# Patient Record
Sex: MALE | Race: AMERICAN INDIAN OR ALASKA NATIVE
[De-identification: names, ages, dates, MRNs, and addresses within clinical notes are randomized per-mention and may not be internally consistent; named-entity substitution may affect disease eponyms.]

---

## 2017-01-18 ENCOUNTER — HOSPITAL ENCOUNTER (EMERGENCY)
Dept: HOSPITAL 5 - ED | Age: 32
Discharge: PSYCHIATRIC OR SUBSTANCE USE FACILITY | End: 2017-01-18
Payer: MEDICARE

## 2017-01-18 VITALS — DIASTOLIC BLOOD PRESSURE: 87 MMHG | SYSTOLIC BLOOD PRESSURE: 147 MMHG

## 2017-01-18 DIAGNOSIS — R45.851: ICD-10-CM

## 2017-01-18 DIAGNOSIS — I10: ICD-10-CM

## 2017-01-18 DIAGNOSIS — E11.9: ICD-10-CM

## 2017-01-18 DIAGNOSIS — F12.10: ICD-10-CM

## 2017-01-18 DIAGNOSIS — F17.200: ICD-10-CM

## 2017-01-18 DIAGNOSIS — F25.9: Primary | ICD-10-CM

## 2017-01-18 LAB
ANION GAP SERPL CALC-SCNC: 18 MMOL/L
BASOPHILS NFR BLD AUTO: 0.8 % (ref 0–1.8)
BILIRUB UR QL STRIP: (no result)
BLOOD UR QL VISUAL: (no result)
BUN SERPL-MCNC: 9 MG/DL (ref 9–20)
BUN/CREAT SERPL: 10 %
CALCIUM SERPL-MCNC: 9.2 MG/DL (ref 8.4–10.2)
CHLORIDE SERPL-SCNC: 98.2 MMOL/L (ref 98–107)
CO2 SERPL-SCNC: 25 MMOL/L (ref 22–30)
EOSINOPHIL NFR BLD AUTO: 0.9 % (ref 0–4.3)
GLUCOSE SERPL-MCNC: 103 MG/DL (ref 75–100)
HCT VFR BLD CALC: 45.2 % (ref 35.5–45.6)
HGB BLD-MCNC: 14.9 GM/DL (ref 11.8–15.2)
KETONES UR STRIP-MCNC: (no result) MG/DL
LEUKOCYTE ESTERASE UR QL STRIP: (no result)
MCH RBC QN AUTO: 29 PG (ref 28–32)
MCHC RBC AUTO-ENTMCNC: 33 % (ref 32–34)
MCV RBC AUTO: 87 FL (ref 84–94)
MUCOUS THREADS #/AREA URNS HPF: (no result) /HPF
NITRITE UR QL STRIP: (no result)
PH UR STRIP: 6 [PH] (ref 5–7)
PLATELET # BLD: 279 K/MM3 (ref 140–440)
POTASSIUM SERPL-SCNC: 4 MMOL/L (ref 3.6–5)
PROT UR STRIP-MCNC: (no result) MG/DL
RBC # BLD AUTO: 5.22 M/MM3 (ref 3.65–5.03)
RBC #/AREA URNS HPF: 21 /HPF (ref 0–6)
SODIUM SERPL-SCNC: 137 MMOL/L (ref 137–145)
URINE DRUGS OF ABUSE NOTE: (no result)
UROBILINOGEN UR-MCNC: < 2 MG/DL (ref ?–2)
WBC # BLD AUTO: 11.2 K/MM3 (ref 4.5–11)
WBC #/AREA URNS HPF: 1 /HPF (ref 0–6)

## 2017-01-18 PROCEDURE — 80307 DRUG TEST PRSMV CHEM ANLYZR: CPT

## 2017-01-18 PROCEDURE — 81001 URINALYSIS AUTO W/SCOPE: CPT

## 2017-01-18 PROCEDURE — G0480 DRUG TEST DEF 1-7 CLASSES: HCPCS

## 2017-01-18 PROCEDURE — 80048 BASIC METABOLIC PNL TOTAL CA: CPT

## 2017-01-18 PROCEDURE — 85025 COMPLETE CBC W/AUTO DIFF WBC: CPT

## 2017-01-18 PROCEDURE — 36415 COLL VENOUS BLD VENIPUNCTURE: CPT

## 2017-01-18 PROCEDURE — 80320 DRUG SCREEN QUANTALCOHOLS: CPT

## 2017-01-18 PROCEDURE — 99285 EMERGENCY DEPT VISIT HI MDM: CPT

## 2017-01-18 NOTE — EMERGENCY DEPARTMENT REPORT
HPI





- General


Chief Complaint: Psych


Time Seen by Provider: 01/18/17 12:02





- HPI


HPI: 


Room 16





The patient is a 31-year-old male presenting with a chief complaint of 

schizoaffective disorder.  Patient states he doesn't an argument with his 

mother property.  The patient states he called police who chest.  When they 

arrived they notified him that his mother had a restraining order against.  The 

patient states the  wanted patient to come to the hospital for 

evaluation.  The patient states he has not had any of his psychiatric 

medications for the past 5-6 days.  Patient denies suicidal or homicidal 

ideation.  Patient denies auditory hallucinations but admits to occasional 

visual hallucinations when he sees "colors."





Location: Mental state


Duration: [see above]


Quality: Agitated


Severity: Moderate


Modifying factors: [see above]


Context: [see above]


Mode of transportation: [not driving]








ED Past Medical Hx





- Past Medical History


Previous Medical History?: Yes


Hx Hypertension: Yes


Hx Diabetes: Yes


Hx Psychiatric Treatment: Yes





- Surgical History


Past Surgical History?: No





- Family History


Family history: no significant





- Social History


Smoking Status: Current Every Day Smoker (1/2 pack per day)


Substance Use Type: None, Marijuana





- Medications


Home Medications: 


 Home Medications











 Medication  Instructions  Recorded  Confirmed  Last Taken  Type


 


DULoxetine [Cymbalta] 30 mg PO BID 04/17/16 04/17/16 3 Days Ago History


 


Lurasidone HCl [Latuda] 40 mg PO QHS 04/17/16 04/17/16 3 Days Ago History


 


diphenhydrAMINE [Benadryl CAP] 50 mg PO QHS PRN 04/17/16 04/17/16 3 Days Ago 

History


 


Haloperidol Lactate [Haldol]  01/18/17  Unknown History


 


Lisinopril [Zestril TAB]  01/18/17  Unknown History


 


traZODone [Desyrel]  01/18/17  Unknown History














ED Review of Systems


ROS: 


Stated complaint: MH EVALUATION


Other details as noted in HPI





Comment: All other systems reviewed and negative


Constitutional: denies: chills, fever


Eyes: denies: eye pain, eye discharge, vision change


ENT: denies: ear pain, throat pain


Respiratory: denies: cough, shortness of breath, wheezing


Cardiovascular: denies: chest pain, palpitations


Endocrine: no symptoms reported


Gastrointestinal: denies: abdominal pain, nausea, diarrhea


Genitourinary: denies: urgency, dysuria


Musculoskeletal: denies: back pain, joint swelling, arthralgia


Skin: denies: rash, lesions


Neurological: headache


Psychiatric: visual hallucinations.  denies: auditory hallucinations, homicidal 

thoughts, suicidal thoughts


Hematological/Lymphatic: denies: easy bleeding, easy bruising





Physical Exam





- Physical Exam


Vital Signs: 


 Vital Signs











  01/18/17 01/18/17





  07:10 12:06


 


Temperature 98.2 F 98.4 F


 


Pulse Rate 70 79


 


Respiratory 20 18





Rate  


 


Blood Pressure 140/102 


 


Blood Pressure  137/92





[Left]  


 


O2 Sat by Pulse 100 100





Oximetry  











Physical Exam: 


GENERAL: The patient is well-developed well-nourished male lying on stretcher 

not appearing to be in acute distress. []


HEENT: Normocephalic.  Atraumatic.  Extraocular motions are intact.  Patient 

has moist mucous membranes.


NECK: Supple.  No meningitic signs are noted.  Trachea midline


CHEST/LUNGS: Clear to auscultation.  There is no respiratory distress noted.


HEART/CARDIOVASCULAR: Regular.  There is no tachycardia.  There is no gallop 

rub or murmur.


ABDOMEN: Abdomen is soft, nontender.  Patient has normal bowel sounds.  There 

is no abdominal distention.


SKIN: There is no rash.  There is no edema.  There is no diaphoresis.


NEURO: The patient is awake, alert, and oriented.  The patient is cooperative. 

The patient has normal speech


MUSCULOSKELETAL: There is no evidence of acute injury.








ED Course


 Vital Signs











  01/18/17 01/18/17





  07:10 12:06


 


Temperature 98.2 F 98.4 F


 


Pulse Rate 70 79


 


Respiratory 20 18





Rate  


 


Blood Pressure 140/102 


 


Blood Pressure  137/92





[Left]  


 


O2 Sat by Pulse 100 100





Oximetry  














- Consultations


Consultation #1: 





01/18/17 13:00





Case discussed with mental health consultant.  He states the patient admitted 

to suicidal ideation with him.  Patient subsequently placed on 1013








ED Medical Decision Making





- Lab Data


Result diagrams: 


 01/18/17 07:37





 01/18/17 07:37





 Laboratory Tests











  01/18/17 01/18/17 01/18/17





  07:17 07:17 07:37


 


WBC   


 


RBC   


 


Hgb   


 


Hct   


 


MCV   


 


MCH   


 


MCHC   


 


RDW   


 


Plt Count   


 


Lymph % (Auto)   


 


Mono % (Auto)   


 


Eos % (Auto)   


 


Baso % (Auto)   


 


Lymph #   


 


Mono #   


 


Eos #   


 


Baso #   


 


Seg Neutrophils %   


 


Seg Neutrophils #   


 


Sodium    137


 


Potassium    4.0


 


Chloride    98.2


 


Carbon Dioxide    25


 


Anion Gap    18


 


BUN    9


 


Creatinine    0.9


 


Estimated GFR    > 60


 


BUN/Creatinine Ratio    10.00


 


Glucose    103 H


 


Calcium    9.2


 


Urine Color  Straw  


 


Urine Turbidity  Clear  


 


Urine pH  6.0  


 


Ur Specific Gravity  1.012  


 


Urine Protein  <15 mg/dl  


 


Urine Glucose (UA)  Neg  


 


Urine Ketones  Neg  


 


Urine Blood  Mod  


 


Urine Nitrite  Neg  


 


Urine Bilirubin  Neg  


 


Urine Urobilinogen  < 2.0  


 


Ur Leukocyte Esterase  Neg  


 


Urine WBC (Auto)  1.0  


 


Urine RBC (Auto)  21.0  


 


Urine Mucus  Few  


 


Urine Opiates Screen   Presumptive negative 


 


Urine Methadone Screen   Presumptive negative 


 


Ur Barbiturates Screen   Presumptive negative 


 


Ur Phencyclidine Scrn   Presumptive negative 


 


Ur Amphetamines Screen   Presumptive negative 


 


U Benzodiazepines Scrn   Presumptive negative 


 


Urine Cocaine Screen   Presumptive negative 


 


U Marijuana (THC) Screen   Presumptive negative 


 


Drugs of Abuse Note   Disclamer 


 


Plasma/Serum Alcohol   














  01/18/17 01/18/17





  07:37 07:37


 


WBC   11.2 H


 


RBC   5.22 H


 


Hgb   14.9


 


Hct   45.2


 


MCV   87


 


MCH   29


 


MCHC   33


 


RDW   14.0


 


Plt Count   279


 


Lymph % (Auto)   20.8


 


Mono % (Auto)   8.1 H


 


Eos % (Auto)   0.9


 


Baso % (Auto)   0.8


 


Lymph #   2.3


 


Mono #   0.9 H


 


Eos #   0.1


 


Baso #   0.1


 


Seg Neutrophils %   69.4


 


Seg Neutrophils #   7.8 H


 


Sodium  


 


Potassium  


 


Chloride  


 


Carbon Dioxide  


 


Anion Gap  


 


BUN  


 


Creatinine  


 


Estimated GFR  


 


BUN/Creatinine Ratio  


 


Glucose  


 


Calcium  


 


Urine Color  


 


Urine Turbidity  


 


Urine pH  


 


Ur Specific Gravity  


 


Urine Protein  


 


Urine Glucose (UA)  


 


Urine Ketones  


 


Urine Blood  


 


Urine Nitrite  


 


Urine Bilirubin  


 


Urine Urobilinogen  


 


Ur Leukocyte Esterase  


 


Urine WBC (Auto)  


 


Urine RBC (Auto)  


 


Urine Mucus  


 


Urine Opiates Screen  


 


Urine Methadone Screen  


 


Ur Barbiturates Screen  


 


Ur Phencyclidine Scrn  


 


Ur Amphetamines Screen  


 


U Benzodiazepines Scrn  


 


Urine Cocaine Screen  


 


U Marijuana (THC) Screen  


 


Drugs of Abuse Note  


 


Plasma/Serum Alcohol  < 0.01 

















- Differential Diagnosis


schizoaffective disorder


Critical care attestation.: 


If time is entered above; I have spent that time in minutes in the direct care 

of this critically ill patient, excluding procedure time.








ED Disposition


Clinical Impression: 


 Schizoaffective disorder, Suicidal ideation


Disposition: DC/TX PSY HOSP/PSY UNIT


Is pt being admited?: No


Does the pt Need Aspirin: No


Condition: Serious


Referrals: 


SAEED,KHUSHBOO J, MD [Primary Care Provider] - 3-5 Days


Time of Disposition: 13:00 (awaiting acceptance)

## 2017-02-23 ENCOUNTER — HOSPITAL ENCOUNTER (EMERGENCY)
Dept: HOSPITAL 5 - ED | Age: 32
LOS: 1 days | Discharge: PSYCHIATRIC OR SUBSTANCE USE FACILITY | End: 2017-02-24
Payer: MEDICARE

## 2017-02-23 DIAGNOSIS — F17.200: ICD-10-CM

## 2017-02-23 DIAGNOSIS — E78.00: ICD-10-CM

## 2017-02-23 DIAGNOSIS — Z91.013: ICD-10-CM

## 2017-02-23 DIAGNOSIS — R45.851: Primary | ICD-10-CM

## 2017-02-23 DIAGNOSIS — I10: ICD-10-CM

## 2017-02-23 DIAGNOSIS — F20.9: ICD-10-CM

## 2017-02-23 PROCEDURE — 80048 BASIC METABOLIC PNL TOTAL CA: CPT

## 2017-02-23 PROCEDURE — 85025 COMPLETE CBC W/AUTO DIFF WBC: CPT

## 2017-02-23 PROCEDURE — 80307 DRUG TEST PRSMV CHEM ANLYZR: CPT

## 2017-02-23 PROCEDURE — 81001 URINALYSIS AUTO W/SCOPE: CPT

## 2017-02-23 PROCEDURE — 99285 EMERGENCY DEPT VISIT HI MDM: CPT

## 2017-02-23 PROCEDURE — 36415 COLL VENOUS BLD VENIPUNCTURE: CPT

## 2017-02-23 PROCEDURE — G0480 DRUG TEST DEF 1-7 CLASSES: HCPCS

## 2017-02-23 PROCEDURE — 80320 DRUG SCREEN QUANTALCOHOLS: CPT

## 2017-02-24 VITALS — DIASTOLIC BLOOD PRESSURE: 74 MMHG | SYSTOLIC BLOOD PRESSURE: 115 MMHG

## 2017-02-24 LAB
ANION GAP SERPL CALC-SCNC: 17 MMOL/L
BASOPHILS NFR BLD AUTO: 1 % (ref 0–1.8)
BILIRUB UR QL STRIP: (no result)
BLOOD UR QL VISUAL: (no result)
BUN SERPL-MCNC: 10 MG/DL (ref 9–20)
BUN/CREAT SERPL: 10 %
CALCIUM SERPL-MCNC: 9.5 MG/DL (ref 8.4–10.2)
CHLORIDE SERPL-SCNC: 96.1 MMOL/L (ref 98–107)
CO2 SERPL-SCNC: 28 MMOL/L (ref 22–30)
EOSINOPHIL NFR BLD AUTO: 0.8 % (ref 0–4.3)
GLUCOSE SERPL-MCNC: 93 MG/DL (ref 75–100)
HCT VFR BLD CALC: 46.3 % (ref 35.5–45.6)
HGB BLD-MCNC: 15.4 GM/DL (ref 11.8–15.2)
KETONES UR STRIP-MCNC: (no result) MG/DL
LEUKOCYTE ESTERASE UR QL STRIP: (no result)
MCH RBC QN AUTO: 29 PG (ref 28–32)
MCHC RBC AUTO-ENTMCNC: 33 % (ref 32–34)
MCV RBC AUTO: 86 FL (ref 84–94)
MUCOUS THREADS #/AREA URNS HPF: (no result) /HPF
NITRITE UR QL STRIP: (no result)
PH UR STRIP: 6 [PH] (ref 5–7)
PLATELET # BLD: 312 K/MM3 (ref 140–440)
POTASSIUM SERPL-SCNC: 4 MMOL/L (ref 3.6–5)
PROT UR STRIP-MCNC: (no result) MG/DL
RBC # BLD AUTO: 5.39 M/MM3 (ref 3.65–5.03)
RBC #/AREA URNS HPF: 13 /HPF (ref 0–6)
SODIUM SERPL-SCNC: 137 MMOL/L (ref 137–145)
URINE DRUGS OF ABUSE NOTE: (no result)
UROBILINOGEN UR-MCNC: 2 MG/DL (ref ?–2)
WBC # BLD AUTO: 12.8 K/MM3 (ref 4.5–11)
WBC #/AREA URNS HPF: 1 /HPF (ref 0–6)

## 2017-02-24 NOTE — CONSULTATION
History of Present Illness





- Reason for Consult


Consult date: 02/24/17


Reason for consult: homeless, needs placement because he got kicked out of a 

[facility] @ [place]





- Chief Complaint


Chief complaint: 





31 year old undomiciled male with a reported past psychiatric history of 

Schizophrenia who now presents due to being administratively discharged for 

[facility] after he had an altercation with a peer. Consequently, he 

reported experiencing suicidal thought without a specific plan during his ER 

presentation. Patient notes that he is planning on going to an independent 

living facility in a few days. Currently, he continues to have some mild 

paranoia towards his mother and ongoing mood symptoms. He is not expressing SI 

with a plan. He denies AVH.





Medications and Allergies


 Allergies











Allergy/AdvReac Type Severity Reaction Status Date / Time


 


peanut Allergy  Swelling Verified 01/28/17 23:53


 


peanut oil Allergy  Swelling Verified 01/18/17 07:11











 Home Medications











 Medication  Instructions  Recorded  Confirmed  Last Taken  Type


 


Duloxetine HCl [Cymbalta] 20 mg PO QDAY 01/18/17 01/18/17 Unknown History


 


Haloperidol [Haldol] 5 mg PO Q6H PRN 01/18/17 01/18/17 Unknown History


 


diphenhydrAMINE [Benadryl CAP] 25 mg PO Q6HR PRN 01/18/17 01/18/17 Unknown 

History


 


traZODone [Desyrel] 1 mg PO DAILY 01/18/17 01/18/17 Unknown History


 


Famotidine [Pepcid] 20 mg PO BID #60 tablet 01/31/17  Unknown Rx


 


Lisinopril [Zestril TAB] 10 mg PO QDAY #30 tablet 01/31/17  Unknown Rx











Active Meds: 


Active Medications





Acetaminophen (Tylenol)  650 mg PO Q4HR PRN


   PRN Reason: Pain MILD(1-3)/Fever >100.5/HA


Al Hydrox/Mg Hydrox/Simethicone (Alum-Mag Hydrox-Simeth [id number]/5ml)  30 

ml PO Q4HR PRN


   PRN Reason: Indigestion


Diphenhydramine HCl (Benadryl)  25 mg PO Q6HR PRN


   PRN Reason: Agitation


Famotidine (Pepcid)  20 mg PO BID ROSE


Haloperidol (Haldol)  5 mg PO Q6H PRN


   PRN Reason: Agitation


Lisinopril (Zestril)  10 mg PO QDAY ROSE


Magnesium Hydroxide (Milk Of Magnesia)  30 ml PO Q12HR PRN


   PRN Reason: Constipation


Miscellaneous Medication (Duloxetine Hcl [Cymbalta])  20 mg PO QDAY ROSE


Trazodone HCl (Desyrel)  100 mg PO DAILY [facility]











Mental Status Exam





- Vital signs


 Last Vital Signs











Temp  99.2 F   02/23/17 23:49


 


Pulse  108 H  02/23/17 23:49


 


Resp  18   02/24/17 01:35


 


BP  135/96   02/23/17 23:49


 


Pulse Ox  99   02/24/17 01:35














- Exam


Orientation: time, place, person


Affect: anxious


Mood: anxious


Thought content: paranoia


Thought Process: Intact


Perceptions: none


Speech: normal rate and pattern


Concentration: focused


Motor activity: lethargic


Level of consciousness: alert


Memory: Intact


Sleep Symptoms: Difficulty Falling Asleep


Interaction: guarded





Results


Result Diagrams: 


 02/24/17 00:10





 02/24/17 00:10


 Abnormal lab results











  02/24/17 02/24/17 Range/Units





  00:10 00:10 


 


WBC   12.8 H  (4.5-11.0)  K/mm3


 


RBC   5.39 H  (3.65-5.03)  M/mm3


 


Hgb   15.4 H  (11.8-15.2)  gm/dl


 


Hct   46.3 H  (35.5-45.6)  %


 


Mono % (Auto)   9.6 H  (0.0-7.3)  %


 


Mono #   1.2 H  (0.0-0.8)  K/mm3


 


Seg Neutrophils #   8.1 H  (1.8-7.7)  K/mm3


 


Chloride  96.1 L   ()  mmol/L








All other labs normal.








Assessment and Plan


Assessment and plan: 





This is a 31 year old undomiciled male with a reported past psychiatric history 

of Schizophrenia who now presents due to being administratively discharged for 

[facility] after he had an altercation with a peer. He is homeless and 

requires some social service support to help find him a residence for the next 

5 days till he transitions to independent living at the beginning of the month. 

His mother is his payee and she will pay for an independent living facility in 

[place].

## 2017-02-24 NOTE — EMERGENCY DEPARTMENT REPORT
ED Psych HPI





- General


Chief Complaint: Psych


Stated Complaint: SUICIDAL


Time Seen by Provider: 02/24/17 01:15


Source: patient


Mode of arrival: Ambulatory


Limitations: No Limitations





- History of Present Illness


Initial Comments: 





31-year-old male with a past medical history hypertension, schizoaffective 

disorder, and elevated cholesterol presents to the hospital complaints of 

suicidal ideation.  Patient was just discharged today from the [facility] 

when he has been admitted for the last 20+ days.  When questioned as to why he 

was admitted he states he was homeless.  Patient apparently still is homeless.  

He states he has been compliant with his medication.  Denies auditory or visual 

hallucinations.  Patient has attempted suicide in the past by toxic ingestion 

no current plan reported.  No homicidal ideation reported.  Patient complains 

of left upper jaw/dental pain.





- Related Data


 Home Medications











 Medication  Instructions  Recorded  Confirmed  Last Taken


 


Duloxetine HCl [Cymbalta] 20 mg PO QDAY 01/18/17 01/18/17 Unknown


 


Haloperidol [Haldol] 5 mg PO Q6H PRN 01/18/17 01/18/17 Unknown


 


diphenhydrAMINE [Benadryl CAP] 25 mg PO Q6HR PRN 01/18/17 01/18/17 Unknown


 


traZODone [Desyrel] 1 mg PO DAILY 01/18/17 01/18/17 Unknown








 Previous Rx's











 Medication  Instructions  Recorded  Last Taken  Type


 


Famotidine [Pepcid] 20 mg PO BID #60 tablet 01/31/17 Unknown Rx


 


Lisinopril [Zestril TAB] 10 mg PO QDAY #30 tablet 01/31/17 Unknown Rx











 Allergies











Allergy/AdvReac Type Severity Reaction Status Date / Time


 


peanut Allergy  Swelling Verified 01/28/17 23:53


 


peanut oil Allergy  Swelling Verified 01/18/17 07:11














ED Review of Systems


ROS: 


Stated complaint: SUICIDAL


Other details as noted in HPI





Comment: All other systems reviewed and negative


Other: 





Constitutional: No fevers chills 


Eyes: No eye pain visual changes 


ENT: as per hpi


Neck: Denies pain


Respiratory: Denies cough wheezing shortness of breath 


Cardiovascular: Denies chest pain, palpitations, syncope


GI: Denies abdominal pain, nausea, vomiting, diarrhea


: Denies dysuria


Musculoskeletal: Denies back pain


Skin: Denies rash, lesions, erythema


Neurologic: Denies headache, numbness, weakness


Psychiatric: as per hpi








ED Past Medical Hx





- Past Medical History


Previous Medical History?: Yes


Hx Hypertension: Yes


Hx Congestive Heart Failure: No


Hx Diabetes: No


Hx Psychiatric Treatment: Yes (SCHIZO)


Hx Asthma: No


Hx COPD: No


Additional medical history: high chol





- Surgical History


Past Surgical History?: No





- Social History


Smoking Status: Current Every Day Smoker


Substance Use Type: None





- Medications


Home Medications: 


 Home Medications











 Medication  Instructions  Recorded  Confirmed  Last Taken  Type


 


Duloxetine HCl [Cymbalta] 20 mg PO QDAY 01/18/17 01/18/17 Unknown History


 


Haloperidol [Haldol] 5 mg PO Q6H PRN 01/18/17 01/18/17 Unknown History


 


diphenhydrAMINE [Benadryl CAP] 25 mg PO Q6HR PRN 01/18/17 01/18/17 Unknown 

History


 


traZODone [Desyrel] 1 mg PO DAILY 01/18/17 01/18/17 Unknown History


 


Famotidine [Pepcid] 20 mg PO BID #60 tablet 01/31/17  Unknown Rx


 


Lisinopril [Zestril TAB] 10 mg PO QDAY #30 tablet 01/31/17  Unknown Rx














ED Physical Exam





- General


Limitations: No Limitations





- Other


Other exam information: 





General: No limitations, patient is alert in no acute distress


Head exam: Atraumatic, normocephalic


Eyes exam: Normal appearance, pupils equal reactive to light, extraocular 

movements intact


ENT: Moist mucous membrane, normal oropharynx


Neck exam: Normal inspection, full range of motion, no meningismus nontender


Respiratory exam: Clear to auscultation bilateral, no wheezes, rales, crackles


Cardiovascular: Normal rate and rhythm, normal heart sounds


Abdomen: Soft, nondistended, and  nontender, with normal bowel sounds, no 

rebound, or guarding


Extremity: Full range of motion normal inspection no deformity


Back: Normal Inspection, full range of motion, no tenderness


Neurologic: Alert, oriented x3, cranial nerves intact, no motor or sensory 

deficit


Psychiatric: normal affect, normal mood


Skin: Warm, dry, intact





ED Course


 Vital Signs











  02/23/17 02/23/17 02/24/17





  23:36 23:49 01:35


 


Temperature 99.2 F 99.2 F 


 


Pulse Rate 108 H 108 H 


 


Respiratory 20 20 18





Rate   


 


Blood Pressure  135/96 


 


Blood Pressure 135/96  





[Left]   


 


O2 Sat by Pulse 99 99 99





Oximetry   














- Reevaluation(s)


Reevaluation #1: 





02/24/17 02:20


Patient was initially tachycardic on triage vital signs however, repeat vital 

signs including orthostatics did not reveal any tachycardia or vital sign 

abnormality





ED Medical Decision Making





- Lab Data


Result diagrams: 


 02/24/17 00:10





 02/24/17 00:10








 Lab Results











  02/24/17 02/24/17 02/24/17 Range/Units





  00:10 00:10 00:10 


 


WBC    12.8 H  (4.5-11.0)  K/mm3


 


RBC    5.39 H  (3.65-5.03)  M/mm3


 


Hgb    15.4 H  (11.8-15.2)  gm/dl


 


Hct    46.3 H  (35.5-45.6)  %


 


MCV    86  (84-94)  fl


 


MCH    29  (28-32)  pg


 


MCHC    33  (32-34)  %


 


RDW    14.0  (13.2-15.2)  %


 


Plt Count    312  (140-440)  K/mm3


 


Lymph % (Auto)    25.3  (13.4-35.0)  %


 


Mono % (Auto)    9.6 H  (0.0-7.3)  %


 


Eos % (Auto)    0.8  (0.0-4.3)  %


 


Baso % (Auto)    1.0  (0.0-1.8)  %


 


Lymph #    3.2  (1.2-5.4)  K/mm3


 


Mono #    1.2 H  (0.0-0.8)  K/mm3


 


Eos #    0.1  (0.0-0.4)  K/mm3


 


Baso #    0.1  (0.0-0.1)  K/mm3


 


Seg Neutrophils %    63.3  (40.0-70.0)  %


 


Seg Neutrophils #    8.1 H  (1.8-7.7)  K/mm3


 


Sodium  137    (137-145)  mmol/L


 


Potassium  4.0    (3.6-5.0)  mmol/L


 


Chloride  96.1 L    ()  mmol/L


 


Carbon Dioxide  28    (22-30)  mmol/L


 


Anion Gap  17    mmol/L


 


BUN  10    (9-20)  mg/dL


 


Creatinine  1.0    (0.8-1.5)  mg/dL


 


Estimated GFR  > 60    ml/min


 


BUN/Creatinine Ratio  10.00    %


 


Glucose  93    ()  mg/dL


 


Calcium  9.5    (8.4-10.2)  mg/dL


 


Urine Color     (Yellow)  


 


Urine Turbidity     (Clear)  


 


Urine pH     (5.0-7.0)  


 


Ur Specific Gravity     (1.003-1.030)  


 


Urine Protein     (Negative)  mg/dL


 


Urine Glucose (UA)     (Negative)  mg/dL


 


Urine Ketones     (Negative)  mg/dL


 


Urine Blood     (Negative)  


 


Urine Nitrite     (Negative)  


 


Urine Bilirubin     (Negative)  


 


Urine Urobilinogen     (<2.0)  mg/dL


 


Ur Leukocyte Esterase     (Negative)  


 


Urine WBC (Auto)     (0.0-6.0)  /HPF


 


Urine RBC (Auto)     (0.0-6.0)  /HPF


 


Urine Mucus     /HPF


 


Urine Opiates Screen     


 


Urine Methadone Screen     


 


Ur Barbiturates Screen     


 


Ur Phencyclidine Scrn     


 


Ur Amphetamines Screen     


 


U Benzodiazepines Scrn     


 


Urine Cocaine Screen     


 


U Marijuana (THC) Screen     


 


Drugs of Abuse Note     


 


Plasma/Serum Alcohol   < 0.01   (0-0.07)  gm%














  02/24/17 02/24/17 Range/Units





  01:46 01:46 


 


WBC    (4.5-11.0)  K/mm3


 


RBC    (3.65-5.03)  M/mm3


 


Hgb    (11.8-15.2)  gm/dl


 


Hct    (35.5-45.6)  %


 


MCV    (84-94)  fl


 


MCH    (28-32)  pg


 


MCHC    (32-34)  %


 


RDW    (13.2-15.2)  %


 


Plt Count    (140-440)  K/mm3


 


Lymph % (Auto)    (13.4-35.0)  %


 


Mono % (Auto)    (0.0-7.3)  %


 


Eos % (Auto)    (0.0-4.3)  %


 


Baso % (Auto)    (0.0-1.8)  %


 


Lymph #    (1.2-5.4)  K/mm3


 


Mono #    (0.0-0.8)  K/mm3


 


Eos #    (0.0-0.4)  K/mm3


 


Baso #    (0.0-0.1)  K/mm3


 


Seg Neutrophils %    (40.0-70.0)  %


 


Seg Neutrophils #    (1.8-7.7)  K/mm3


 


Sodium    (137-145)  mmol/L


 


Potassium    (3.6-5.0)  mmol/L


 


Chloride    ()  mmol/L


 


Carbon Dioxide    (22-30)  mmol/L


 


Anion Gap    mmol/L


 


BUN    (9-20)  mg/dL


 


Creatinine    (0.8-1.5)  mg/dL


 


Estimated GFR    ml/min


 


BUN/Creatinine Ratio    %


 


Glucose    ()  mg/dL


 


Calcium    (8.4-10.2)  mg/dL


 


Urine Color  Yellow   (Yellow)  


 


Urine Turbidity  Clear   (Clear)  


 


Urine pH  6.0   (5.0-7.0)  


 


Ur Specific Gravity  1.021   (1.003-1.030)  


 


Urine Protein  <15 mg/dl   (Negative)  mg/dL


 


Urine Glucose (UA)  Neg   (Negative)  mg/dL


 


Urine Ketones  Tr   (Negative)  mg/dL


 


Urine Blood  Neg   (Negative)  


 


Urine Nitrite  Neg   (Negative)  


 


Urine Bilirubin  Neg   (Negative)  


 


Urine Urobilinogen  2.0   (<2.0)  mg/dL


 


Ur Leukocyte Esterase  Neg   (Negative)  


 


Urine WBC (Auto)  1.0   (0.0-6.0)  /HPF


 


Urine RBC (Auto)  13.0   (0.0-6.0)  /HPF


 


Urine Mucus  1+   /HPF


 


Urine Opiates Screen   Presumptive negative  


 


Urine Methadone Screen   Presumptive negative  


 


Ur Barbiturates Screen   Presumptive negative  


 


Ur Phencyclidine Scrn   Presumptive negative  


 


Ur Amphetamines Screen   Presumptive negative  


 


U Benzodiazepines Scrn   Presumptive negative  


 


Urine Cocaine Screen   Presumptive negative  


 


U Marijuana (THC) Screen   Presumptive negative  


 


Drugs of Abuse Note   Disclamer  


 


Plasma/Serum Alcohol    (0-0.07)  gm%














- Medical Decision Making





Patient is medically clear for psychiatric admission.  Patient admits in the ER 

to myself and mental health evaluated that he is only suicidal because he is 

homeless and if we can arrange a place for him and stay with support he would 

not commit suicide.  Denies active plan currently.  We're attempting to admit 

patient to [facility] program similar to the [facility] program he was 

discharged from.  A psychiatrist as scheduled to round in the a.m. and will 

sign a 1013 if necessary.  At my disposition time a 1013 has not been signed 

and we are attempting to get patient into the outpatient treatment which will 

occur during next shift.





- Differential Diagnosis


secondary gain, homelessness, suicidal ideation, schizophrenia, psychosis


Critical Care Time: No


Critical care attestation.: 


If time is entered above; I have spent that time in minutes in the direct care 

of this critically ill patient, excluding procedure time.








ED Disposition


Clinical Impression: 


 Suicidal ideation, Schizoaffective disorder, Homelessness, Medical clearance 

for psychiatric admission





Disposition: DC/TX PSY HOSP/PSY UNIT


Is pt being admited?: No


Condition: Stable


Time of Disposition: 05:38 (awaiting acceptance)

## 2021-04-29 ENCOUNTER — HOSPITAL ENCOUNTER (EMERGENCY)
Dept: HOSPITAL 5 - ED | Age: 36
Discharge: LEFT BEFORE DISCHARGE | End: 2021-04-29
Payer: MEDICARE

## 2021-04-29 VITALS — DIASTOLIC BLOOD PRESSURE: 82 MMHG | SYSTOLIC BLOOD PRESSURE: 128 MMHG

## 2021-04-29 DIAGNOSIS — R00.2: ICD-10-CM

## 2021-04-29 DIAGNOSIS — Z53.21: ICD-10-CM

## 2021-04-29 DIAGNOSIS — R06.00: ICD-10-CM

## 2021-04-29 DIAGNOSIS — R07.89: Primary | ICD-10-CM

## 2021-04-29 LAB
ALBUMIN SERPL-MCNC: 4 G/DL (ref 3.9–5)
ALT SERPL-CCNC: 21 UNITS/L (ref 7–56)
APTT BLD: 28.9 SEC. (ref 24.2–36.6)
BASOPHILS # (AUTO): 0 K/MM3 (ref 0–0.1)
BASOPHILS NFR BLD AUTO: 0.4 % (ref 0–1.8)
BUN SERPL-MCNC: 9 MG/DL (ref 9–20)
BUN/CREAT SERPL: 11 %
CALCIUM SERPL-MCNC: 9.2 MG/DL (ref 8.4–10.2)
EOSINOPHIL # BLD AUTO: 0.1 K/MM3 (ref 0–0.4)
EOSINOPHIL NFR BLD AUTO: 1 % (ref 0–4.3)
HCT VFR BLD CALC: 43.5 % (ref 35.5–45.6)
HEMOLYSIS INDEX: 12
HGB BLD-MCNC: 14.5 GM/DL (ref 11.8–15.2)
INR PPP: 1.01 (ref 0.87–1.13)
LYMPHOCYTES # BLD AUTO: 1.5 K/MM3 (ref 1.2–5.4)
LYMPHOCYTES NFR BLD AUTO: 15.1 % (ref 13.4–35)
MCHC RBC AUTO-ENTMCNC: 33 % (ref 32–34)
MCV RBC AUTO: 87 FL (ref 84–94)
MONOCYTES # (AUTO): 1 K/MM3 (ref 0–0.8)
MONOCYTES % (AUTO): 10.6 % (ref 0–7.3)
PLATELET # BLD: 295 K/MM3 (ref 140–440)
RBC # BLD AUTO: 4.99 M/MM3 (ref 3.65–5.03)

## 2021-04-29 PROCEDURE — 83735 ASSAY OF MAGNESIUM: CPT

## 2021-04-29 PROCEDURE — 71046 X-RAY EXAM CHEST 2 VIEWS: CPT

## 2021-04-29 PROCEDURE — 84484 ASSAY OF TROPONIN QUANT: CPT

## 2021-04-29 PROCEDURE — 93005 ELECTROCARDIOGRAM TRACING: CPT

## 2021-04-29 PROCEDURE — 85730 THROMBOPLASTIN TIME PARTIAL: CPT

## 2021-04-29 PROCEDURE — 36415 COLL VENOUS BLD VENIPUNCTURE: CPT

## 2021-04-29 PROCEDURE — 85025 COMPLETE CBC W/AUTO DIFF WBC: CPT

## 2021-04-29 PROCEDURE — 80053 COMPREHEN METABOLIC PANEL: CPT

## 2021-04-29 PROCEDURE — 85610 PROTHROMBIN TIME: CPT

## 2021-04-29 NOTE — EMERGENCY DEPARTMENT REPORT
ED General Adult HPI





- General


Chief complaint: Chest Pain


Stated complaint: CHEST PAIN DUE TO MEDICATION


Time Seen by Provider: 04/29/21 10:18


Source: patient


Mode of arrival: Ambulatory


Limitations: No Limitations





- History of Present Illness


Severity scale (0 -10): 8





- Related Data


                                Home Medications











 Medication  Instructions  Recorded  Confirmed  Last Taken


 


Duloxetine HCl [Cymbalta] 20 mg PO QDAY 01/18/17 01/18/17 Unknown


 


diphenhydrAMINE [Benadryl CAP] 25 mg PO Q6HR PRN 01/18/17 01/18/17 Unknown


 


haloperidoL [Haldol] 5 mg PO Q6H PRN 01/18/17 01/18/17 Unknown


 


traZODone [Desyrel] 1 mg PO DAILY 01/18/17 01/18/17 Unknown








                                  Previous Rx's











 Medication  Instructions  Recorded  Last Taken  Type


 


Famotidine [Pepcid] 20 mg PO BID #60 tablet 01/31/17 Unknown Rx


 


lisinopriL [Zestril TAB] 10 mg PO QDAY #30 tablet 01/31/17 Unknown Rx











                                    Allergies











Allergy/AdvReac Type Severity Reaction Status Date / Time


 


peanut Allergy  Swelling Verified 01/28/17 23:53


 


peanut oil Allergy  Swelling Verified 01/18/17 07:11














ED Review of Systems


ROS: 


Stated complaint: CHEST PAIN DUE TO MEDICATION


Other details as noted in HPI








ED Past Medical Hx





- Past Medical History


Previous Medical History?: Yes


Hx Hypertension: Yes


Hx Congestive Heart Failure: No


Hx Diabetes: No


Hx Psychiatric Treatment: Yes ([facility])


Hx Asthma: No


Hx COPD: No


Additional medical history: high chol





- Surgical History


Past Surgical History?: No





- Social History


Smoking Status: Current Every Day Smoker


Substance Use Type: None





- Medications


Home Medications: 


                                Home Medications











 Medication  Instructions  Recorded  Confirmed  Last Taken  Type


 


Duloxetine HCl [Cymbalta] 20 mg PO QDAY 01/18/17 01/18/17 Unknown History


 


diphenhydrAMINE [Benadryl CAP] 25 mg PO Q6HR PRN 01/18/17 01/18/17 Unknown 

History


 


haloperidoL [Haldol] 5 mg PO Q6H PRN 01/18/17 01/18/17 Unknown History


 


traZODone [Desyrel] 1 mg PO DAILY 01/18/17 01/18/17 Unknown History


 


Famotidine [Pepcid] 20 mg PO BID #60 tablet 01/31/17  Unknown Rx


 


lisinopriL [Zestril TAB] 10 mg PO QDAY #30 tablet 01/31/17  Unknown Rx














ED Physical Exam





- General


Limitations: No Limitations





ED Course





                                   Vital Signs











  04/29/21





  09:30


 


Temperature 98.4 F


 


Pulse Rate 93 H


 


Respiratory 18





Rate 


 


Blood Pressure 128/82





[Right] 


 


O2 Sat by Pulse 96





Oximetry 











Critical care attestation.: 


If time is entered above; I have spent that time in minutes in the direct care 

of this critically ill patient, excluding procedure time.








ED Disposition


Condition: Stable





HEART Score





- HEART Score


EKG: Non-specific


Age: < 45

## 2021-04-29 NOTE — EVENT NOTE
ED Screening Note


Date of service: 04/29/21


Time: 10:27


ED Screening Note: 


35-year-old male patient with history of hypertension, hyperlipidemia, and 

tobacco use presents to the emergency department with complaints of left-sided 

chest pain with associated dyspnea, palpitations, and generalized weakness 

starting yesterday.  Symptoms are intermittent and worse with exertion.  Patient

is on Depakote for substance abuse disorder and attributes his pain to 

restarting this medication earlier in the week.





General: Awake, appropriately interactive, no acute distress. 


Neck: Supple. Full range of motion intact. 


Cardiovascular: Normal peripheral perfusion. 


Pulmonary: No respiratory distress. Patient is speaking normally without use of 

accessory muscles.


Skin: No apparent rashes or lesions. 


Neurological: No facial asymmetry. Speech is clear. Follows commands. Patient is

alert and oriented. 


Musculoskeletal: Moves all four extremities spontaneously with normal range of 

motion. 


Psych: Cooperative. Appropriate mood and affect.





EKG shows normal sinus rhythm with a ventricular rate of 84 bpm.  Normal axis.  

Normal NE interval.  Normal QT interval.  Anterior T wave inversions; unchanged 

from prior EKG in 2017.





I have greeted and performed a focused rapid initial assessment of this patient.

 A comprehensive ED assessment and evaluation of the patient, analysis of all 

test results, and completion of the medical decision-making process will be 

conducted by additional ED providers. This initial assessment/diagnostic 

orders/clinical plan/treatment(s) is/are subject to change based on patients 

health status, clinical progression and re-assessment. Further treatment and 

workup at subsequent clinical provider's discretion. Patient/guardian urged not 

to elope from the ED as their condition may be serious if not clinically 

assessed and managed.

## 2021-04-29 NOTE — XRAY REPORT
CHEST 2 VIEWS 



INDICATION / CLINICAL INFORMATION:

chest pain.



COMPARISON: 

None available.



FINDINGS:



SUPPORT DEVICES: None.



HEART / MEDIASTINUM: No significant abnormality. 



LUNGS / PLEURA: No significant pulmonary or pleural abnormality. No pneumothorax. 



ADDITIONAL FINDINGS: No significant additional findings.



IMPRESSION:

1. No acute findings.



Signer Name: Teodora Aquino MD 

Signed: 4/29/2021 10:42 AM

Workstation Name: [id number]

## 2021-04-30 NOTE — ELECTROCARDIOGRAPH REPORT
[facility]

                                       

Test Date:    2021               Test Time:    09:36:39

Pat Name:     KENIA RICKETTS          Department:   

Patient ID:   [id number]          Room:          

Gender:       M                        Technician:   

:          1985               Requested By: YANDY FERRELL

Order Number: [id number]              Reading MD:   Bolivar White

                                 Measurements

Intervals                              Axis          

Rate:         84                       P:            37

IL:           139                      QRS:          20

QRSD:         89                       T:            43

QT:           340                                    

QTc:          403                                    

                           Interpretive Statements

Sinus rhythm

Nonspecific T abnormalities, anterior leads

No previous ECG available for comparison

Electronically Signed On 2021 13:52:04 EDT by Bolivar White

## 2021-05-01 ENCOUNTER — HOSPITAL ENCOUNTER (EMERGENCY)
Dept: HOSPITAL 5 - ED | Age: 36
Discharge: HOME | End: 2021-05-01
Payer: MEDICARE

## 2021-05-01 VITALS — DIASTOLIC BLOOD PRESSURE: 84 MMHG | SYSTOLIC BLOOD PRESSURE: 136 MMHG

## 2021-05-01 DIAGNOSIS — Z91.010: ICD-10-CM

## 2021-05-01 DIAGNOSIS — I10: ICD-10-CM

## 2021-05-01 DIAGNOSIS — Z79.899: ICD-10-CM

## 2021-05-01 DIAGNOSIS — R07.89: Primary | ICD-10-CM

## 2021-05-01 DIAGNOSIS — F17.200: ICD-10-CM

## 2021-05-01 DIAGNOSIS — F20.9: ICD-10-CM

## 2021-05-01 LAB
BASOPHILS # (AUTO): 0 K/MM3 (ref 0–0.1)
BASOPHILS NFR BLD AUTO: 0.3 % (ref 0–1.8)
BENZODIAZEPINES SCREEN,URINE: (no result)
BILIRUB UR QL STRIP: (no result)
BLOOD UR QL VISUAL: (no result)
BUN SERPL-MCNC: 11 MG/DL (ref 9–20)
BUN/CREAT SERPL: 12 %
CALCIUM SERPL-MCNC: 9.4 MG/DL (ref 8.4–10.2)
EOSINOPHIL # BLD AUTO: 0.2 K/MM3 (ref 0–0.4)
EOSINOPHIL NFR BLD AUTO: 1.4 % (ref 0–4.3)
HCT VFR BLD CALC: 46 % (ref 35.5–45.6)
HEMOLYSIS INDEX: 4
HGB BLD-MCNC: 15.3 GM/DL (ref 11.8–15.2)
LYMPHOCYTES # BLD AUTO: 2.4 K/MM3 (ref 1.2–5.4)
LYMPHOCYTES NFR BLD AUTO: 18.9 % (ref 13.4–35)
MCHC RBC AUTO-ENTMCNC: 33 % (ref 32–34)
MCV RBC AUTO: 89 FL (ref 84–94)
METHADONE SCREEN,URINE: (no result)
MONOCYTES # (AUTO): 1.6 K/MM3 (ref 0–0.8)
MONOCYTES % (AUTO): 12.4 % (ref 0–7.3)
MUCOUS THREADS #/AREA URNS HPF: (no result) /HPF
OPIATE SCREEN,URINE: (no result)
PH UR STRIP: 7 [PH] (ref 5–7)
PLATELET # BLD: 276 K/MM3 (ref 140–440)
PROT UR STRIP-MCNC: (no result) MG/DL
RBC # BLD AUTO: 5.2 M/MM3 (ref 3.65–5.03)
RBC #/AREA URNS HPF: 4 /HPF (ref 0–6)
UROBILINOGEN UR-MCNC: < 2 MG/DL (ref ?–2)
WBC #/AREA URNS HPF: 3 /HPF (ref 0–6)

## 2021-05-01 PROCEDURE — 85025 COMPLETE CBC W/AUTO DIFF WBC: CPT

## 2021-05-01 PROCEDURE — 80307 DRUG TEST PRSMV CHEM ANLYZR: CPT

## 2021-05-01 PROCEDURE — 80048 BASIC METABOLIC PNL TOTAL CA: CPT

## 2021-05-01 PROCEDURE — 80320 DRUG SCREEN QUANTALCOHOLS: CPT

## 2021-05-01 PROCEDURE — 36415 COLL VENOUS BLD VENIPUNCTURE: CPT

## 2021-05-01 PROCEDURE — 81001 URINALYSIS AUTO W/SCOPE: CPT

## 2021-05-01 PROCEDURE — G0480 DRUG TEST DEF 1-7 CLASSES: HCPCS

## 2021-05-01 NOTE — EMERGENCY DEPARTMENT REPORT
ED Medical Clearance HPI





- General


Chief complaint: Medical Clearance


Stated complaint: MEDICAL CLEARANCE


Time Seen by Provider: 05/01/21 05:45


Source: patient


Mode of arrival: Ambulatory





- History of Present Illness


Initial comments: 





35-year-old American male patient of anchor for substance abuse presents 

emergency department seeking to be medically cleared again.  Apparently patient 

left the facility for moment and then instructed to return the advised he would 

need to get his vital signs repeated to restart the process as unsure what may 

have taken place in their absence.  Reports no fever, chills, sweats, 

palpitations, vomiting


Alledged Intoxication: No


Compliant with Home Medications: No


Traumatic Symptoms: denies traumatic injury


Associated Symptoms: denies: chest pain, shortness of breath, diaphoresis, 

denies other symptoms, cough, headaches, anorexia, nausea/vomiting, rash, 

seizure, syncope, weakness


Home medications: 


                                Home Medications











 Medication  Instructions  Recorded  Confirmed  Last Taken


 


Duloxetine HCl [Cymbalta] 20 mg PO QDAY 01/18/17 01/18/17 Unknown


 


diphenhydrAMINE [Benadryl CAP] 25 mg PO Q6HR PRN 01/18/17 01/18/17 Unknown


 


haloperidoL [Haldol] 5 mg PO Q6H PRN 01/18/17 01/18/17 Unknown


 


traZODone [Desyrel] 1 mg PO DAILY 01/18/17 01/18/17 Unknown








                                  Previous Rx's











 Medication  Instructions  Recorded  Last Taken  Type


 


Famotidine [Pepcid] 20 mg PO BID #60 tablet 01/31/17 Unknown Rx


 


lisinopriL [Zestril TAB] 10 mg PO QDAY #30 tablet 01/31/17 Unknown Rx











Allergies/Adverse reactions: 


                                    Allergies











Allergy/AdvReac Type Severity Reaction Status Date / Time


 


peanut Allergy  Swelling Verified 01/28/17 23:53


 


peanut oil Allergy  Swelling Verified 01/18/17 07:11














ED Review of Systems


ROS: 


Stated complaint: MEDICAL CLEARANCE


Other details as noted in HPI





Comment: All other systems reviewed and negative





ED Past Medical Hx





- Past Medical History


Previous Medical History?: Yes


Hx Hypertension: Yes


Hx Congestive Heart Failure: No


Hx Diabetes: No


Hx Psychiatric Treatment: Yes ([facility])


Hx Asthma: No


Hx COPD: No


Additional medical history: high chol





- Surgical History


Past Surgical History?: No





- Social History


Smoking Status: Current Every Day Smoker


Substance Use Type: None





- Medications


Home Medications: 


                                Home Medications











 Medication  Instructions  Recorded  Confirmed  Last Taken  Type


 


Duloxetine HCl [Cymbalta] 20 mg PO QDAY 01/18/17 01/18/17 Unknown History


 


diphenhydrAMINE [Benadryl CAP] 25 mg PO Q6HR PRN 01/18/17 01/18/17 Unknown 

History


 


haloperidoL [Haldol] 5 mg PO Q6H PRN 01/18/17 01/18/17 Unknown History


 


traZODone [Desyrel] 1 mg PO DAILY 01/18/17 01/18/17 Unknown History


 


Famotidine [Pepcid] 20 mg PO BID #60 tablet 01/31/17  Unknown Rx


 


lisinopriL [Zestril TAB] 10 mg PO QDAY #30 tablet 01/31/17  Unknown Rx














ED Physical Exam





- General


Limitations: No Limitations


General appearance: alert, in no apparent distress





- Head


Head exam: Present: atraumatic, normocephalic





- Eye


Eye exam: Present: normal appearance





- ENT


ENT exam: Present: mucous membranes moist





- Neck


Neck exam: Present: normal inspection





- Respiratory


Respiratory exam: Present: normal lung sounds bilaterally.  Absent: respiratory 

distress





- Cardiovascular


Cardiovascular Exam: Present: regular rate, normal rhythm.  Absent: systolic 

murmur, diastolic murmur, rubs, gallop





- GI/Abdominal


GI/Abdominal exam: Present: soft, normal bowel sounds





- Rectal


Rectal exam: Present: deferred





- Extremities Exam


Extremities exam: Present: normal inspection





- Back Exam


Back exam: Present: normal inspection





- Neurological Exam


Neurological exam: Present: alert, oriented X3





- Psychiatric


Psychiatric exam: Present: normal affect, normal mood





- Skin


Skin exam: Present: warm, dry, intact, normal color.  Absent: rash





ED Course





                                   Vital Signs











  05/01/21





  04:03


 


Temperature 97.9 F


 


Pulse Rate 78


 


Respiratory 18





Rate 


 


Blood Pressure 136/84


 


O2 Sat by Pulse 96





Oximetry 














ED Medical Decision Making





- Lab Data


Result diagrams: 


                                 05/01/21 04:20





                                 05/01/21 04:20





ED Disposition


Disposition: DC-01 TO HOME OR SELFCARE


Is pt being admited?: No


Does the pt Need Aspirin: No


Condition: Stable


Referrals: 


PRIMARY CARE,MD [Primary Care Provider] - 3-5 Days

## 2022-04-21 ENCOUNTER — HOSPITAL ENCOUNTER (EMERGENCY)
Dept: HOSPITAL 5 - ED | Age: 37
LOS: 1 days | Discharge: HOME | End: 2022-04-22
Payer: MEDICARE

## 2022-04-21 DIAGNOSIS — I10: ICD-10-CM
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
## 2022-04-21 DIAGNOSIS — Z91.010: ICD-10-CM
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
## 2022-04-21 DIAGNOSIS — F17.200: ICD-10-CM
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
## 2022-04-21 DIAGNOSIS — S39.011A: Primary | ICD-10-CM

## 2022-04-21 PROCEDURE — 74018 RADEX ABDOMEN 1 VIEW: CPT

## 2022-04-21 PROCEDURE — 99283 EMERGENCY DEPT VISIT LOW MDM: CPT

## 2022-04-22 VITALS — DIASTOLIC BLOOD PRESSURE: 84 MMHG | SYSTOLIC BLOOD PRESSURE: 134 MMHG

## 2022-04-22 NOTE — XRAY REPORT
XR abdomen 1V ap 



INDICATION / CLINICAL INFORMATION: abd pain. Standing pain.



COMPARISON: None available.



TECHNIQUE:  One view supine AP abdomen.



FINDINGS:



TUBES / LINES: None.

BOWEL GAS PATTERN: No significant abnormality. 

FREE AIR / EXTRALUMINAL GAS: None seen.



ADDITIONAL FINDINGS: No significant additional findings.



IMPRESSION:

1. No significant abnormality.



Signer Name: Kurt Puentes II, MD 

Signed: 4/22/2022 5:07 AM

Workstation Name: [id number]

## 2022-04-22 NOTE — EMERGENCY DEPARTMENT REPORT
ED Abdominal Pain HPI





- General


Chief Complaint: Abdominal Pain


Stated Complaint: ABD PAIN


Time Seen by Provider: 04/22/22 03:42


Source: patient, EMS


Mode of arrival: Stretcher


Limitations: No Limitations





- History of Present Illness


Initial Comments: 


Patient 36-year-old male who presents for right upper quadrant and right flank 

pain.  States he lifted a heavy box and felt a snap appointment.  Patient denies

fevers or chills no nausea no vomiting no dysuria frequency urgency or 

hematuria.  Patient denies history of renal stones.  Patient is alert oriented 

x3 patient is tolerating p.o. there is been no fever or chills.


MD Complaint: abdominal pain, flank pain





- Related Data


                                Home Medications











 Medication  Instructions  Recorded  Confirmed  Last Taken


 


Duloxetine HCl [Cymbalta] 20 mg PO QDAY 01/18/17 01/18/17 Unknown


 


diphenhydrAMINE [Benadryl CAP] 25 mg PO Q6HR PRN 01/18/17 01/18/17 Unknown


 


haloperidoL [Haldol] 5 mg PO Q6H PRN 01/18/17 01/18/17 Unknown


 


traZODone [Desyrel] 1 mg PO DAILY 01/18/17 01/18/17 Unknown








                                  Previous Rx's











 Medication  Instructions  Recorded  Last Taken  Type


 


Famotidine [Pepcid] 20 mg PO BID #60 tablet 01/31/17 Unknown Rx


 


lisinopriL [Zestril TAB] 10 mg PO QDAY #30 tablet 01/31/17 Unknown Rx


 


Naproxen 500 mg PO BID PRN #30 tab 04/22/22 Unknown Rx











                                    Allergies











Allergy/AdvReac Type Severity Reaction Status Date / Time


 


peanut Allergy  Swelling Verified 01/28/17 23:53


 


peanut oil Allergy  Swelling Verified 01/18/17 07:11














ED Review of Systems


ROS: 


Stated complaint: ABD PAIN


Other details as noted in HPI





Constitutional: no symptoms reported.  denies: chills, fever, malaise


Eyes: denies: eye pain, eye discharge, vision change


ENT: denies: ear pain, throat pain, congestion


Respiratory: denies: cough, shortness of breath, wheezing


Cardiovascular: denies: chest pain, palpitations


Endocrine: no symptoms reported


Gastrointestinal: denies: abdominal pain, nausea, diarrhea, constipation


Genitourinary: denies: urgency, dysuria


Musculoskeletal: denies: back pain, joint swelling, arthralgia


Skin: denies: rash, lesions


Neurological: denies: headache, weakness, paresthesias, vertigo


Psychiatric: denies: anxiety, depression


Hematological/Lymphatic: denies: easy bleeding, easy bruising





ED Past Medical Hx





- Past Medical History


Hx Hypertension: Yes


Hx Congestive Heart Failure: No


Hx Diabetes: No


Hx Psychiatric Treatment: Yes ([facility])


Hx Asthma: No


Hx COPD: No


Additional medical history: high chol





- Social History


Smoking Status: Current Every Day Smoker


Substance Use Type: None





- Medications


Home Medications: 


                                Home Medications











 Medication  Instructions  Recorded  Confirmed  Last Taken  Type


 


Duloxetine HCl [Cymbalta] 20 mg PO QDAY 01/18/17 01/18/17 Unknown History


 


diphenhydrAMINE [Benadryl CAP] 25 mg PO Q6HR PRN 01/18/17 01/18/17 Unknown 

History


 


haloperidoL [Haldol] 5 mg PO Q6H PRN 01/18/17 01/18/17 Unknown History


 


traZODone [Desyrel] 1 mg PO DAILY 01/18/17 01/18/17 Unknown History


 


Famotidine [Pepcid] 20 mg PO BID #60 tablet 01/31/17  Unknown Rx


 


lisinopriL [Zestril TAB] 10 mg PO QDAY #30 tablet 01/31/17  Unknown Rx


 


Naproxen 500 mg PO BID PRN #30 tab 04/22/22  Unknown Rx














ED Physical Exam





- General


Limitations: No Limitations


General appearance: alert, in no apparent distress





- Head


Head exam: Present: atraumatic, normocephalic





- Eye


Eye exam: Present: normal appearance, PERRL, EOMI


Pupils: Present: normal accommodation





- ENT


ENT exam: Present: mucous membranes moist





- Neck


Neck exam: Present: normal inspection





- Respiratory


Respiratory exam: Present: normal lung sounds bilaterally.  Absent: respiratory 

distress





- Cardiovascular


Cardiovascular Exam: Present: regular rate, normal rhythm.  Absent: systolic 

murmur, diastolic murmur, rubs, gallop





- GI/Abdominal


GI/Abdominal exam: Present: soft, tenderness (Right abdominal wall tenderness no

crepitus no ecchymosis no step-off), normal bowel sounds.  Absent: distended, 

guarding, rebound, rigid, bruit, hernia





- Rectal


Rectal exam: Present: deferred





- 


 exam: Present: normal inspection





- Extremities Exam


Extremities exam: Present: normal inspection, full ROM, normal capillary refill





- Back Exam


Back exam: Present: normal inspection, full ROM.  Absent: tenderness, CVA 

tenderness (R), CVA tenderness (L), rash noted





- Neurological Exam


Neurological exam: Present: alert, oriented X3, CN II-XII intact, normal gait, 

reflexes normal.  Absent: motor sensory deficit





- Psychiatric


Psychiatric exam: Present: normal affect, normal mood





- Skin


Skin exam: Present: warm, dry, intact, normal color





ED Course


                                   Vital Signs











  04/21/22





  23:09


 


Temperature 98.5 F


 


Pulse Rate 84


 


Respiratory 12





Rate 


 


Blood Pressure 140/87


 


O2 Sat by Pulse 98





Oximetry 














ED Medical Decision Making





- Radiology Data


Radiology results: report reviewed, image reviewed


 


XR abdomen 1V ap   


 


 INDICATION / CLINICAL INFORMATION: abd pain. Standing pain.  


 


 COMPARISON: None available.  


 


 TECHNIQUE:  One view supine AP abdomen.  


 


 FINDINGS:  


 


 TUBES / LINES: None.  


 BOWEL GAS PATTERN: No significant abnormality.   


 FREE AIR / EXTRALUMINAL GAS: None seen.  


 


 ADDITIONAL FINDINGS: No significant additional findings.  


 


 IMPRESSION:  


 1. No significant abnormality.  


 


 Signer Name: Melissa Puentes II, MD   


 Signed: 4/22/2022 5:07 AM  


 Workstation Name: [id number]   


 


 


Transcribed By: FARZANEH  


Dictated By: MELISSA PUENTES II, MD  


Electronically Authenticated By: MELISSA PUENTES II, MD    


Signed Date/Time: 04/22/22 0507                                


 


 


 


DD/DT: 04/22/22 0507                                                            

 


TD/TT:


























- Medical Decision Making


KUB normal, patient declines labs, however there is no dysuria frequency urgency

or hematuria.  There is been no fevers chills no nausea no vomiting.  Vital 

signs are stable.  Pain is improved with ibuprofen given in ED.  Abdominal wall 

pain is reproducible to deep palpation.  Plan DC to home, NSAIDs, follow-up with

primary care doctor in 2 to 3 days.  Patient will return to emergency department

should symptoms worsen.  Patient DC'd home in stable condition at this time.





Critical care attestation.: 


If time is entered above; I have spent that time in minutes in the direct care 

of this critically ill patient, excluding procedure time.








ED Disposition


Clinical Impression: 


Abdominal wall strain


Qualifiers:


 Encounter type: initial encounter Qualified Code(s): S39.011A - Strain of 

muscle, fascia and tendon of abdomen, initial encounter





Disposition: 01 HOME / SELF CARE / HOMELESS


Is pt being admited?: No


Does the pt Need Aspirin: No


Condition: Stable


Instructions:  Muscle Strain, Easy-to-Read, Adductor Muscle Strain


Additional Instructions: 


Take medication as prescribed, use moist heat therapy to abdominal wall as 

needed.  Follow-up with your doctor in 2 to 3 days.  Return to emergency 

department should symptoms worsen.


Prescriptions: 


Naproxen 500 mg PO BID PRN #30 tab


 PRN Reason: pain


Referrals: 


JOHN PILLAI MD [Primary Care Provider] - 3-5 Days


Forms:  Work/School Release Form(ED)


Time of Disposition: 05:49